# Patient Record
(demographics unavailable — no encounter records)

---

## 2024-10-22 NOTE — PHYSICAL EXAM
[Flexion] : flexion [Extension] : extension [Bending to left] : bending to left [Bending to right] : bending to right [Right] : right hip [5___] : flexion 5[unfilled]/5 [] : no swelling

## 2024-10-22 NOTE — PROCEDURE
[Trigger point 1-2 muscle groups] : trigger point 1-2 muscle groups [Bilateral] : bilaterally of the [Lumbar paraspinal muscle] : lumbar paraspinal muscle [Pain] : pain [Inflammation] : inflammation [Sterile technique used] : sterile technique used [___ cc    6mg] :  Betamethasone (Celestone) ~Vcc of 6mg [___ cc    1%] : Lidocaine ~Vcc of 1%  [] : Patient tolerated procedure well [Call if redness, pain or fever occur] : call if redness, pain or fever occur

## 2024-10-22 NOTE — ASSESSMENT
[FreeTextEntry1] : Unable to RTW to perform job duties Lumbar TPI's today, post injection instructions given [facility] spine conditioning, stretching as able, HEP Return prn
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
## 2024-10-22 NOTE — REASON FOR VISIT
[FreeTextEntry2] : 10/22/24  Overall disability unchanged, she continues to have back/hip, leg issues. Lower lumbar pain and spasms with short periods of standing and sitting.  09/13/2024 Continues to have back/hip, leg issues. Had MRI done of spine showing cervical fusion, multiple thoracic herniations